# Patient Record
Sex: MALE | Race: WHITE | NOT HISPANIC OR LATINO | URBAN - METROPOLITAN AREA
[De-identification: names, ages, dates, MRNs, and addresses within clinical notes are randomized per-mention and may not be internally consistent; named-entity substitution may affect disease eponyms.]

---

## 2022-06-25 ENCOUNTER — APPOINTMENT (OUTPATIENT)
Dept: CT IMAGING | Facility: CLINIC | Age: 72
DRG: 062 | End: 2022-06-25
Attending: INTERNAL MEDICINE
Payer: COMMERCIAL

## 2022-06-25 ENCOUNTER — APPOINTMENT (OUTPATIENT)
Dept: SPEECH THERAPY | Facility: CLINIC | Age: 72
DRG: 062 | End: 2022-06-25
Attending: INTERNAL MEDICINE
Payer: COMMERCIAL

## 2022-06-25 ENCOUNTER — HOSPITAL ENCOUNTER (INPATIENT)
Facility: CLINIC | Age: 72
LOS: 1 days | Discharge: HOME | DRG: 062 | End: 2022-06-26
Attending: INTERNAL MEDICINE | Admitting: PSYCHIATRY & NEUROLOGY
Payer: COMMERCIAL

## 2022-06-25 DIAGNOSIS — I63.9 ACUTE CVA (CEREBROVASCULAR ACCIDENT) (H): ICD-10-CM

## 2022-06-25 DIAGNOSIS — Z20.822 LAB TEST NEGATIVE FOR COVID-19 VIRUS: ICD-10-CM

## 2022-06-25 LAB
ANION GAP SERPL CALCULATED.3IONS-SCNC: 11 MMOL/L (ref 7–15)
APTT PPP: 30 SECONDS (ref 22–38)
ATRIAL RATE - MUSE: 45 BPM
BASOPHILS # BLD AUTO: 0 10E3/UL (ref 0–0.2)
BASOPHILS NFR BLD AUTO: 0 %
BUN SERPL-MCNC: 25.5 MG/DL (ref 8–23)
CALCIUM SERPL-MCNC: 9.5 MG/DL (ref 8.8–10.2)
CHLORIDE SERPL-SCNC: 108 MMOL/L (ref 98–107)
CHOLEST SERPL-MCNC: 206 MG/DL
CREAT BLD-MCNC: 1 MG/DL (ref 0.7–1.3)
CREAT SERPL-MCNC: 0.99 MG/DL (ref 0.67–1.17)
DEPRECATED HCO3 PLAS-SCNC: 23 MMOL/L (ref 22–29)
DIASTOLIC BLOOD PRESSURE - MUSE: NORMAL MMHG
EOSINOPHIL # BLD AUTO: 0.1 10E3/UL (ref 0–0.7)
EOSINOPHIL NFR BLD AUTO: 3 %
ERYTHROCYTE [DISTWIDTH] IN BLOOD BY AUTOMATED COUNT: 13.2 % (ref 10–15)
GFR SERPL CREATININE-BSD FRML MDRD: 81 ML/MIN/1.73M2
GFR SERPL CREATININE-BSD FRML MDRD: >60 ML/MIN/1.73M2
GLUCOSE BLDC GLUCOMTR-MCNC: 108 MG/DL (ref 70–99)
GLUCOSE BLDC GLUCOMTR-MCNC: 96 MG/DL (ref 70–99)
GLUCOSE SERPL-MCNC: 107 MG/DL (ref 70–99)
HBA1C MFR BLD: 6.3 %
HCT VFR BLD AUTO: 45 % (ref 40–53)
HDLC SERPL-MCNC: 43 MG/DL
HGB BLD-MCNC: 14.8 G/DL (ref 13.3–17.7)
HOLD SPECIMEN: NORMAL
IMM GRANULOCYTES # BLD: 0 10E3/UL
IMM GRANULOCYTES NFR BLD: 0 %
INR PPP: 0.94 (ref 0.85–1.15)
INTERPRETATION ECG - MUSE: NORMAL
LDLC SERPL CALC-MCNC: 136 MG/DL
LYMPHOCYTES # BLD AUTO: 1.2 10E3/UL (ref 0.8–5.3)
LYMPHOCYTES NFR BLD AUTO: 26 %
MCH RBC QN AUTO: 28.7 PG (ref 26.5–33)
MCHC RBC AUTO-ENTMCNC: 32.9 G/DL (ref 31.5–36.5)
MCV RBC AUTO: 87 FL (ref 78–100)
MONOCYTES # BLD AUTO: 0.4 10E3/UL (ref 0–1.3)
MONOCYTES NFR BLD AUTO: 10 %
NEUTROPHILS # BLD AUTO: 2.7 10E3/UL (ref 1.6–8.3)
NEUTROPHILS NFR BLD AUTO: 61 %
NONHDLC SERPL-MCNC: 163 MG/DL
NRBC # BLD AUTO: 0 10E3/UL
NRBC BLD AUTO-RTO: 0 /100
P AXIS - MUSE: 55 DEGREES
PLATELET # BLD AUTO: 193 10E3/UL (ref 150–450)
POTASSIUM SERPL-SCNC: 4.1 MMOL/L (ref 3.4–5.3)
PR INTERVAL - MUSE: 186 MS
QRS DURATION - MUSE: 108 MS
QT - MUSE: 472 MS
QTC - MUSE: 408 MS
R AXIS - MUSE: 71 DEGREES
RADIOLOGIST FLAGS: ABNORMAL
RBC # BLD AUTO: 5.16 10E6/UL (ref 4.4–5.9)
SARS-COV-2 RNA RESP QL NAA+PROBE: NEGATIVE
SODIUM SERPL-SCNC: 142 MMOL/L (ref 136–145)
SYSTOLIC BLOOD PRESSURE - MUSE: NORMAL MMHG
T AXIS - MUSE: 58 DEGREES
TRIGL SERPL-MCNC: 136 MG/DL
TROPONIN T SERPL HS-MCNC: 14 NG/L
TROPONIN T SERPL HS-MCNC: 14 NG/L
TROPONIN T SERPL HS-MCNC: 16 NG/L
TROPONIN T SERPL HS-MCNC: 16 NG/L
VENTRICULAR RATE- MUSE: 45 BPM
WBC # BLD AUTO: 4.4 10E3/UL (ref 4–11)

## 2022-06-25 PROCEDURE — 250N000013 HC RX MED GY IP 250 OP 250 PS 637: Performed by: STUDENT IN AN ORGANIZED HEALTH CARE EDUCATION/TRAINING PROGRAM

## 2022-06-25 PROCEDURE — 93005 ELECTROCARDIOGRAM TRACING: CPT | Performed by: INTERNAL MEDICINE

## 2022-06-25 PROCEDURE — 99291 CRITICAL CARE FIRST HOUR: CPT | Mod: 25 | Performed by: INTERNAL MEDICINE

## 2022-06-25 PROCEDURE — 85025 COMPLETE CBC W/AUTO DIFF WBC: CPT | Performed by: INTERNAL MEDICINE

## 2022-06-25 PROCEDURE — 36415 COLL VENOUS BLD VENIPUNCTURE: CPT | Performed by: INTERNAL MEDICINE

## 2022-06-25 PROCEDURE — 92610 EVALUATE SWALLOWING FUNCTION: CPT | Mod: GN

## 2022-06-25 PROCEDURE — 999N000176 HC STATISTIC STROKE CODE W/O ACCESS

## 2022-06-25 PROCEDURE — 99291 CRITICAL CARE FIRST HOUR: CPT | Performed by: PSYCHIATRY & NEUROLOGY

## 2022-06-25 PROCEDURE — 85730 THROMBOPLASTIN TIME PARTIAL: CPT | Performed by: INTERNAL MEDICINE

## 2022-06-25 PROCEDURE — 85610 PROTHROMBIN TIME: CPT | Performed by: INTERNAL MEDICINE

## 2022-06-25 PROCEDURE — 36415 COLL VENOUS BLD VENIPUNCTURE: CPT | Performed by: NURSE PRACTITIONER

## 2022-06-25 PROCEDURE — 80061 LIPID PANEL: CPT | Performed by: STUDENT IN AN ORGANIZED HEALTH CARE EDUCATION/TRAINING PROGRAM

## 2022-06-25 PROCEDURE — 99285 EMERGENCY DEPT VISIT HI MDM: CPT | Mod: 25 | Performed by: INTERNAL MEDICINE

## 2022-06-25 PROCEDURE — 92526 ORAL FUNCTION THERAPY: CPT | Mod: GN

## 2022-06-25 PROCEDURE — 83036 HEMOGLOBIN GLYCOSYLATED A1C: CPT | Performed by: STUDENT IN AN ORGANIZED HEALTH CARE EDUCATION/TRAINING PROGRAM

## 2022-06-25 PROCEDURE — 3E03317 INTRODUCTION OF OTHER THROMBOLYTIC INTO PERIPHERAL VEIN, PERCUTANEOUS APPROACH: ICD-10-PCS | Performed by: INTERNAL MEDICINE

## 2022-06-25 PROCEDURE — 70496 CT ANGIOGRAPHY HEAD: CPT | Mod: 26 | Performed by: RADIOLOGY

## 2022-06-25 PROCEDURE — 82565 ASSAY OF CREATININE: CPT

## 2022-06-25 PROCEDURE — U0005 INFEC AGEN DETEC AMPLI PROBE: HCPCS | Performed by: INTERNAL MEDICINE

## 2022-06-25 PROCEDURE — 250N000011 HC RX IP 250 OP 636: Performed by: PSYCHIATRY & NEUROLOGY

## 2022-06-25 PROCEDURE — 70496 CT ANGIOGRAPHY HEAD: CPT

## 2022-06-25 PROCEDURE — 250N000011 HC RX IP 250 OP 636: Performed by: INTERNAL MEDICINE

## 2022-06-25 PROCEDURE — 93010 ELECTROCARDIOGRAM REPORT: CPT | Performed by: INTERNAL MEDICINE

## 2022-06-25 PROCEDURE — 200N000002 HC R&B ICU UMMC

## 2022-06-25 PROCEDURE — 70450 CT HEAD/BRAIN W/O DYE: CPT

## 2022-06-25 PROCEDURE — 250N000009 HC RX 250: Performed by: INTERNAL MEDICINE

## 2022-06-25 PROCEDURE — 84484 ASSAY OF TROPONIN QUANT: CPT | Performed by: NURSE PRACTITIONER

## 2022-06-25 PROCEDURE — 82310 ASSAY OF CALCIUM: CPT | Performed by: INTERNAL MEDICINE

## 2022-06-25 PROCEDURE — 70498 CT ANGIOGRAPHY NECK: CPT

## 2022-06-25 PROCEDURE — 70498 CT ANGIOGRAPHY NECK: CPT | Mod: 26 | Performed by: RADIOLOGY

## 2022-06-25 PROCEDURE — 70450 CT HEAD/BRAIN W/O DYE: CPT | Mod: 76

## 2022-06-25 PROCEDURE — C9803 HOPD COVID-19 SPEC COLLECT: HCPCS | Performed by: INTERNAL MEDICINE

## 2022-06-25 PROCEDURE — 84484 ASSAY OF TROPONIN QUANT: CPT | Performed by: INTERNAL MEDICINE

## 2022-06-25 RX ORDER — SIMVASTATIN 20 MG
20 TABLET ORAL AT BEDTIME
Status: ON HOLD | COMMUNITY
End: 2022-06-26

## 2022-06-25 RX ORDER — HYDRALAZINE HYDROCHLORIDE 20 MG/ML
10 INJECTION INTRAMUSCULAR; INTRAVENOUS EVERY 10 MIN PRN
Status: DISCONTINUED | OUTPATIENT
Start: 2022-06-25 | End: 2022-06-26 | Stop reason: HOSPADM

## 2022-06-25 RX ORDER — SODIUM CHLORIDE 9 MG/ML
INJECTION, SOLUTION INTRAVENOUS CONTINUOUS PRN
Status: DISCONTINUED | OUTPATIENT
Start: 2022-06-25 | End: 2022-06-25

## 2022-06-25 RX ORDER — FINASTERIDE 5 MG/1
5 TABLET, FILM COATED ORAL DAILY
COMMUNITY

## 2022-06-25 RX ORDER — FINASTERIDE 5 MG/1
5 TABLET, FILM COATED ORAL DAILY
Status: DISCONTINUED | OUTPATIENT
Start: 2022-06-25 | End: 2022-06-26 | Stop reason: HOSPADM

## 2022-06-25 RX ORDER — HYDRALAZINE HYDROCHLORIDE 20 MG/ML
10-20 INJECTION INTRAMUSCULAR; INTRAVENOUS EVERY 30 MIN PRN
Status: DISCONTINUED | OUTPATIENT
Start: 2022-06-25 | End: 2022-06-25

## 2022-06-25 RX ORDER — LABETALOL HYDROCHLORIDE 5 MG/ML
10 INJECTION, SOLUTION INTRAVENOUS EVERY 10 MIN PRN
Status: DISCONTINUED | OUTPATIENT
Start: 2022-06-25 | End: 2022-06-26 | Stop reason: HOSPADM

## 2022-06-25 RX ORDER — TAMSULOSIN HYDROCHLORIDE 0.4 MG/1
0.4 CAPSULE ORAL 2 TIMES DAILY
Status: DISCONTINUED | OUTPATIENT
Start: 2022-06-25 | End: 2022-06-26 | Stop reason: HOSPADM

## 2022-06-25 RX ORDER — SIMVASTATIN 40 MG
40 TABLET ORAL AT BEDTIME
Status: ON HOLD | COMMUNITY
End: 2022-06-25

## 2022-06-25 RX ORDER — TAMSULOSIN HYDROCHLORIDE 0.4 MG/1
0.4 CAPSULE ORAL DAILY
Status: ON HOLD | COMMUNITY
End: 2022-06-25

## 2022-06-25 RX ORDER — LATANOPROST 50 UG/ML
1 SOLUTION/ DROPS OPHTHALMIC DAILY
COMMUNITY

## 2022-06-25 RX ORDER — ATORVASTATIN CALCIUM 40 MG/1
40 TABLET, FILM COATED ORAL EVERY EVENING
Status: DISCONTINUED | OUTPATIENT
Start: 2022-06-25 | End: 2022-06-26 | Stop reason: HOSPADM

## 2022-06-25 RX ORDER — LIDOCAINE 40 MG/G
CREAM TOPICAL
Status: DISCONTINUED | OUTPATIENT
Start: 2022-06-25 | End: 2022-06-26 | Stop reason: HOSPADM

## 2022-06-25 RX ORDER — FINASTERIDE 5 MG/1
5 TABLET, FILM COATED ORAL DAILY
Status: ON HOLD | COMMUNITY
End: 2022-06-25

## 2022-06-25 RX ORDER — LATANOPROST 50 UG/ML
1 SOLUTION/ DROPS OPHTHALMIC DAILY
Status: DISCONTINUED | OUTPATIENT
Start: 2022-06-25 | End: 2022-06-26 | Stop reason: HOSPADM

## 2022-06-25 RX ORDER — IOPAMIDOL 755 MG/ML
75 INJECTION, SOLUTION INTRAVASCULAR ONCE
Status: COMPLETED | OUTPATIENT
Start: 2022-06-25 | End: 2022-06-25

## 2022-06-25 RX ORDER — LABETALOL HYDROCHLORIDE 5 MG/ML
10-20 INJECTION, SOLUTION INTRAVENOUS EVERY 10 MIN PRN
Status: DISCONTINUED | OUTPATIENT
Start: 2022-06-25 | End: 2022-06-25

## 2022-06-25 RX ORDER — ACETAMINOPHEN 325 MG/1
650 TABLET ORAL EVERY 4 HOURS PRN
Status: DISCONTINUED | OUTPATIENT
Start: 2022-06-25 | End: 2022-06-26 | Stop reason: HOSPADM

## 2022-06-25 RX ORDER — TAMSULOSIN HYDROCHLORIDE 0.4 MG/1
0.4 CAPSULE ORAL 2 TIMES DAILY
COMMUNITY

## 2022-06-25 RX ADMIN — ATORVASTATIN CALCIUM 40 MG: 40 TABLET, FILM COATED ORAL at 19:55

## 2022-06-25 RX ADMIN — FINASTERIDE 5 MG: 5 TABLET, FILM COATED ORAL at 15:39

## 2022-06-25 RX ADMIN — TAMSULOSIN HYDROCHLORIDE 0.4 MG: 0.4 CAPSULE ORAL at 19:55

## 2022-06-25 RX ADMIN — LATANOPROST 1 DROP: 50 SOLUTION OPHTHALMIC at 15:39

## 2022-06-25 RX ADMIN — IOPAMIDOL 75 ML: 755 INJECTION, SOLUTION INTRAVENOUS at 08:09

## 2022-06-25 RX ADMIN — ACETAMINOPHEN 650 MG: 325 TABLET, FILM COATED ORAL at 16:39

## 2022-06-25 RX ADMIN — TENECTEPLASE 23.5 MG: KIT at 08:30

## 2022-06-25 RX ADMIN — SODIUM CHLORIDE, PRESERVATIVE FREE 90 ML: 5 INJECTION INTRAVENOUS at 08:09

## 2022-06-25 ASSESSMENT — ENCOUNTER SYMPTOMS
FEVER: 0
FACIAL ASYMMETRY: 1
NECK STIFFNESS: 0
WEAKNESS: 1
CONFUSION: 1
DIFFICULTY URINATING: 0
HEADACHES: 0
SHORTNESS OF BREATH: 0
ARTHRALGIAS: 0
EYE REDNESS: 0
COLOR CHANGE: 0
ABDOMINAL PAIN: 0

## 2022-06-25 ASSESSMENT — ACTIVITIES OF DAILY LIVING (ADL)
CHANGE_IN_FUNCTIONAL_STATUS_SINCE_ONSET_OF_CURRENT_ILLNESS/INJURY: YES
DRESSING/BATHING_DIFFICULTY: NO
TOILETING_ISSUES: NO
ADLS_ACUITY_SCORE: 21
DOING_ERRANDS_INDEPENDENTLY_DIFFICULTY: NO
ADLS_ACUITY_SCORE: 21
VISION_MANAGEMENT: GLASSES
CONCENTRATING,_REMEMBERING_OR_MAKING_DECISIONS_DIFFICULTY: NO
ADLS_ACUITY_SCORE: 21
FALL_HISTORY_WITHIN_LAST_SIX_MONTHS: NO
ADLS_ACUITY_SCORE: 21
WALKING_OR_CLIMBING_STAIRS_DIFFICULTY: NO
ADLS_ACUITY_SCORE: 21
ADLS_ACUITY_SCORE: 21
ADLS_ACUITY_SCORE: 35
WEAR_GLASSES_OR_BLIND: YES
DIFFICULTY_EATING/SWALLOWING: NO
ADLS_ACUITY_SCORE: 35

## 2022-06-25 ASSESSMENT — VISUAL ACUITY
OU: GLASSES;BASELINE

## 2022-06-25 NOTE — PHARMACY-ADMISSION MEDICATION HISTORY
Admission Medication History Completed by Pharmacy    See [facility] Admission Navigator for allergy information, preferred outpatient pharmacy, prior to admission medications and immunization status.     Medication History Sources:     Patient    [facility]    Changes made to PTA medication list (reason):    Added: All     Deleted: None    Changed: None    Additional Information:    Patient new indications for which he was on medications, but couldn't recall his medications. All medications per [facility] fill history.     Prior to Admission medications    Medication Sig Last Dose Taking? Auth Provider Long Term End Date   finasteride (PROSCAR) 5 MG tablet Take 5 mg by mouth daily  Yes Unknown, Entered By History     latanoprost (XALATAN) 0.005 % ophthalmic solution Place 1 drop into both eyes daily  Yes Unknown, Entered By History Yes    simvastatin (ZOCOR) 20 MG tablet Take 20 mg by mouth At Bedtime  Yes Unknown, Entered By History No    tamsulosin (FLOMAX) 0.4 MG capsule Take 0.4 mg by mouth 2 times daily  Yes Unknown, Entered By History         Date completed: 06/25/22    Medication history completed by: Christofer Rausch RP

## 2022-06-25 NOTE — ED TRIAGE NOTES
"Triage Assessment & Note:    BP (!) 175/52   Pulse (!) 45   Temp 97.3  F (36.3  C) (Oral)   Resp 16   Ht 1.854 m (6' 1\")   Wt 93 kg (205 lb)   SpO2 98%   BMI 27.05 kg/m      Patient presents with: BIBA SPF for new onset left side weakness left facial droop and increased confusion with on set around 0700 today.     Home Treatments/Remedies: None    Febrile / Afebrile? Afebrile     Duration of C/o: 1 hr     Alcon Junior RN  June 25, 2022         Triage Assessment     Row Name 06/25/22 0806       Triage Assessment (Adult)    Airway WDL WDL       Respiratory WDL    Respiratory WDL WDL       Cognitive/Neuro/Behavioral WDL    Cognitive/Neuro/Behavioral WDL WDL              "

## 2022-06-25 NOTE — PROGRESS NOTES
Admitted/transferred from: ED   Reason for admission/transfer: [place] with TNK  2 RN skin assessment: completed by: Tarsha KRUGER  Result of skin assessment and interventions/actions: None taken  Height, weight, drug calc weight: Done  Patient belongings (see Flowsheet)  MDRO education added to care plan: Yes

## 2022-06-25 NOTE — PROGRESS NOTES
Stroke Code Nurse-Responder Note    Arrival Time to Stroke Code:0800    Stroke Code Team interventions: Tenecteplase given by Alcon RANGEL ED RN double checked by Maureen Berumen RN at 0830      ED/Bedside Nurse providing handoff: Alcon RANGEL    Time left for CT: in CT at 0809    Time arrived to next location (ED/Unit/IR): Back in ED at 0815    ED/Bedside Nurse given handoff (name/time): ALIYAH David RN at 1100    Luisa Bertrand, RN

## 2022-06-25 NOTE — PHARMACY
Pharmacy Stroke Code Response    Pharmacist responded as part of the Stroke Code Team activation to patient care area ED.      Patient weight (in kg): 93 kg.  Weight was obtained from RX STROKE WEIGHT: ED gurney weight.    The Stroke Team determined that the patient was a candidate for IV tenecteplase therapy and requested that tenecteplase be made at 0825.    Dose of tenecteplase: A total dose of 23.25 mg was calculated (0.25 mg/kg). This is to be given as a bolus over 5 seconds. Calculation double check performed by: DMITRIY Rondon.    The tenecteplase dose was handed off to nursing at 0830.    The tenecteplase dose was administered at 0830.  These were administered in ED.    The neurology team entered the tenecteplase orders into [facility].      Christofer Rausch RPH

## 2022-06-25 NOTE — H&P
Neuroscience Intensive Care History & Physical    Reason for critical care admission: post tNK  Admitting Team: MORGAN  Date of Service:  06/25/2022  Date of Admission:  6/25/2022  Hospital Day: 1    Assessment/Plan  Trev Bergeron is a 72 year old male with a past medical history of HTN and BPH admitted on 6/25/2022 for left hemiparesis and left sensory loss in the setting of R M2 stenosis vs occlusion, now s/p tNK.    Neuro  #R M2 occlusion:  -Neurochecks every 1 hrs  -MRI Brain without IV contrast pending  -TTE, EKG, troponin  -LDL, HgbA1c pending  -Cardiac monitoring  -Antiplatelet and anticoagulation held until outside 24 hour window  -HOB > 30   -SBP goal < 180 mmHg  -PT/OT/SLP    #Analgesics & sedation  -Tylenol prn     CV  #Hypertension:  -Cardiac monitoring  -SBP goal < 180 mmHg  -PRN labetalol and hydralazine    Resp  Oxygen/vent: none on room air  -Continuous pulse ox  -Maintain O2 saturations greater than 92%    GI  Diet: cleared for regular diet  Last BM: PTA  GI prophylaxis: not indicated  -Bowel regimen: scheduled senna-docusate and Miralax    Renal/  -Daily BMP  -IV fluids: none  -Electrolyte replacement protocol    Endo  -Hgb A1c pending  -Monitor glucose levels    Heme  -Daily CBC  -Hgb goal >7, plt goal >50k  -Transfuse to meet Hgb and plt goals    ID  -Afebrile  -Daily CBC  -Follow temperature curve    ICU Check List  Lines/tubes/drains:  FEN: regular diet  PPX: DVT - SCDs, no chemoppx due to tNK; GI - not indicated.  Code: Full Code  Dispo: ICU - NCC    Clinically Significant Risk Factors Present on Admission                         TIME SPENT ON THIS ENCOUNTER   I spent 35 minutes of critical care time on the unit/floor managing the care of Trev Bergeron . Upon evaluation, this patient had a high probability of imminent or life-threatening deterioration due to stroke s/p thrombolytic, which required my direct attention, intervention, and personal management. Greater than 50% of my time was  "spent at the bedside counseling the patient and/or coordinating care regarding services listed in this note.    Liane Jo MD    History of Present Illness:  Trev Bergeron is a 72 year old male with a past medical history of hypertension and BPH admitted on 6/25/2022 for left hemiparesis s/p tNK.      Patient reportedly woke up around 6 am and then around 7 am he noted onset of left sided weakness, left sided numbness, and left facial droop.  A stroke code was called upon arrival to the ED and initial NIHSS 4 (points for sensory deficits, left facial, and left homonymous hemianopsia).  CTA showed a right M2 occlusion vs high grade stenosis.  Patient received tNK at 0830.  NIHSS improved to a 0-1 with only subtle left hand weakness and subtle left facial asymmetry (patient has this at baseline).  He was admitted to the ICU post thrombolytic and denied any complaints.    Allergies   Allergen Reactions     Penicillins        PAST MEDICAL HISTORY: No past medical history on file.    PAST SURGICAL HISTORY: No past surgical history on file.    FAMILY HISTORY:   No significant family history, including no history of      SOCIAL HISTORY:   Social History     Tobacco Use     Smoking status: Not on file     Smokeless tobacco: Not on file   Substance Use Topics     Alcohol use: Not on file       ROS: The 10 point Review of Systems is negative other than noted in the HPI or here.    Current Medications:    sodium chloride (PF)  3 mL Intracatheter Q8H       PRN Medications:  labetalol **OR** hydrALAZINE, lidocaine 4%, lidocaine (buffered or not buffered), - MEDICATION INSTRUCTIONS -, niCARdipine, sodium chloride (PF)    Infusions:    - MEDICATION INSTRUCTIONS -       niCARdipine         Physical Examination:  Vitals: BP (!) 153/88   Pulse (!) 47   Temp 97.6  F (36.4  C) (Oral)   Resp 16   Ht 1.905 m (6' 3\")   Wt 88.7 kg (195 lb 8.8 oz)   SpO2 98%   BMI 24.44 kg/m    General: Adult male patient, lying in bed, " NAD  HEENT: Normocephalic, atraumatic, no icterus, oral cavity/oropharynx pink and moist  Cardiac: RRR, s1/s2 auscultated without murmur, S3/S4  Chest: CTAB, unlabored, expansion symmetric, no retractions or use of accessory muscles  Abdomen: Soft, non-distended, bowel sounds present  Extremities: Warm, no edema, distal pulses +2, well perfused  Skin: No rash or lesion  Psych: Calm and cooperative  Neuro:  Mental status: Awake, alert, attentive, oriented to self, time, place, and circumstance. Language is fluent and coherent with intact comprehension of complex commands, naming and repetition.  Cranial nerves: VFF, PERRL, conjugate gaze, EOMI, pupils +* round and reactive, facial sensation intact, mild left facial asymmetry, shoulder shrug strong, tongue midline, no dysarthria.   Motor: Normal bulk and tone. No abnormal movements. 5/5 strength in 4/4 extremities.   Sensory: Intact to light touch x 4 extremities  Coordination: FNF and HS without ataxia or dysmetria. Rapid alternating movements intact.   Gait: BIPIN, deferred.    NIHSS  Interval baseline (06/25/22 0820)   1a. Level of Consciousness 0-->Alert, keenly responsive   1b. LOC Questions 0-->Answers both questions correctly   1c. LOC Commands 0-->Performs both tasks correctly   2.   Best Gaze 0-->Normal   3.   Visual 0-->No visual loss   4.   Facial Palsy 1-->Minor paralysis (flattened nasolabial fold, asymmetry on smiling)   5a. Motor Arm, Left 0-->No drift, limb holds 90 (or 45) degrees for full 10 secs   5b. Motor Arm, Right 0-->No drift, limb holds 90 (or 45) degrees for full 10 secs   6a. Motor Leg, Left 0-->No drift, leg holds 30 degree position for full 5 secs   6b. Motor Leg, right 0-->No drift, leg holds 30 degree position for full 5 secs   7.   Limb Ataxia 0-->Absent   8.   Sensory 0-->Normal, no sensory loss   9.   Best Language 0-->No aphasia, normal   10. Dysarthria 0-->Normal   11. Extinction and Inattention  0-->No abnormality   Total 1 (06/25/22  1230)     ICH Score (at arrival)  Scoring Tool Score   Age ? 80 years 1 point     GCS score  3-4   5-12   13-15   2 point  1 point  0 point     Hematoma volume, cm 3 ? 30 1 point     Intraventricular extension 1 point     Infratentorial location 1 point     Total       Hunt and Ty Scale (at arrival)  Grade           Labs:  Recent Labs   Lab 22  0806 22  0800   NA  --  142   POTASSIUM  --  4.1   CHLORIDE  --  108*   CO2  --  23   BUN  --  25.5*   CR 1.0 0.99   BUTCH  --  9.5       Recent Labs   Lab 22  0800   WBC 4.4   HGB 14.8          No results for input(s): PH, PCO2, PO2, HCO3 in the last 168 hours.    All cultures:  No results for input(s): CULTURE in the last 168 hours.    Imagin22 CTA Head and Neck:  Impression:    1. Focal occlusion versus high-grade narrowing of an M2 branch of the  right MCA.  2. 50% stenosis of the proximal right ICA.  3. 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    22 CT Head without IV contrast:  Impression:  No acute intracranial pathology.     All relevant imaging and laboratory values personally reviewed.

## 2022-06-25 NOTE — ED NOTES
Bed: ED03  Expected date: 6/25/22  Expected time: 7:57 AM  Means of arrival: Ambulance  Comments:  [address]    73 y/o Male    Weakness  Confusion    Last known well 0650    Glucose 101  /85

## 2022-06-25 NOTE — PROGRESS NOTES
06/25/22 1254   General Information   Onset of Illness/Injury or Date of Surgery 06/25/22   Referring Physician Dane Alexandre MD   Patient/Family Therapy Goal Statement (SLP) None stated   Pertinent History of Current Problem 72M w/ pmhx HLD on simvastatin, BPH  presented with left sided weakness (mild) and left facial droop, stroke code was called LKN was 7 am (woke up at 6 am).  NIHSS was 4 in the ED for sensory deficits and left facial, and left homonymous hemiansopsia.  CT and CTA showed R M2 distal occlusion vs stenosis, tNK was given.  NIHSS improved to a 0, subtle hand weakness may still be present and question of left subtle facial droop. STAT swallow eval ordered per NCC. Clinical swallow eval completed at 1130 per MD orders.   Past History of Dysphagia No hx of dysphagia per chart review or pt report   Type of Evaluation   Type of Evaluation Swallow Evaluation   Oral Motor   Oral Musculature anomalies present   Structural Abnormalities none present   Mucosal Quality adequate   Dentition (Oral Motor)   Dentition (Oral Motor) adequate dentition   Facial Symmetry (Oral Motor)   Facial Symmetry (Oral Motor) left side impairment   Left Side Facial Asymmetry minimal impairment  (baseline per wife and pt report)   Lip Function (Oral Motor)   Lip Range of Motion (Oral Motor) WNL   Tongue Function (Oral Motor)   Tongue ROM (Oral Motor) WNL   Jaw Function (Oral Motor)   Jaw Function (Oral Motor) WNL   Cough/Swallow/Gag Reflex (Oral Motor)   Soft Palate/Velum (Oral Motor) WNL   Volitional Throat Clear/Cough (Oral Motor) WNL   Vocal Quality/Secretion Management (Oral Motor)   Vocal Quality (Oral Motor) WNL   General Swallowing Observations   Respiratory Support (General Swallowing Observations) none   Current Diet/Method of Nutritional Intake (General Swallowing Observations, NIS) NPO   Swallowing Evaluation Clinical swallow evaluation   Clinical Swallow Evaluation   Feeding Assistance no assistance needed    Clinical Swallow Evaluation Textures Trialed thin liquids;pureed;solid foods   Clinical Swallow Eval: Thin Liquid Texture Trial   Mode of Presentation, Thin Liquids cup;self-fed   Volume of Liquid or Food Presented 5 oz   Oral Phase of Swallow WFL   Pharyngeal Phase of Swallow throat clearing   Diagnostic Statement Thin liquids via cup resulted in minimal throat clearing, however this was unchanged from baseline throat clearing. No additional overt s/sx of aspiration.   Clinical Swallow Evaluation: Puree Solid Texture Trial   Mode of Presentation, Puree spoon;self-fed   Volume of Puree Presented 4 tbsp   Oral Phase, Puree WFL   Pharyngeal Phase, Puree throat clearing   Diagnostic Statement Pureed textures resulted in minimal throat clearing, however this was unchanged from baseline throat clearing. No additional overt s/sx of aspiration.   Clinical Swallow Evaluation: Solid Food Texture Trial   Mode of Presentation self-fed   Volume Presented 4 tbsp   Oral Phase WFL   Pharyngeal Phase throat clearing   Diagnostic Statement Regular solid textures resulted in minimal throat clearing, however this was unchanged from baseline throat clearing. No additional overt s/sx of aspiration.   Swallowing Recommendations   Diet Consistency Recommendations thin liquids (level 0);regular diet   Supervision Level for Intake patient independent   Mode of Delivery Recommendations bolus size, small;food moistened;slow rate of intake   Swallowing Maneuver Recommendations alternate food and liquid intake   Monitoring/Assistance Required (Eating/Swallowing) monitor for cough or change in vocal quality with intake;stop eating activities when fatigue is present;cue for finger/lingual sweep if oral pocketing present   Recommended Feeding/Eating Techniques (Swallow Eval) maintain upright sitting position for eating;maintain upright posture during/after eating for 30 minutes;set-up and prepare tray   Medication Administration Recommendations,  Swallowing (SLP) as tolerated   Instrumental Assessment Recommendations instrumental evaluation not recommended at this time   General Therapy Interventions   Planned Therapy Interventions Dysphagia Treatment   Dysphagia treatment Instruction of safe swallow strategies;Compensatory strategies for swallowing   Clinical Impression   Criteria for Skilled Therapeutic Interventions Met (SLP Eval) Current level of function same as previous level of function   SLP Diagnosis Oropharyngeal swallow WFL   Risks & Benefits of therapy have been explained evaluation/treatment results reviewed;care plan/treatment goals reviewed;risks/benefits reviewed;current/potential barriers reviewed;participants voiced agreement with care plan;participants included;patient;spouse/significant other   Clinical Impression Comments Clinical swallow eval completed per MD orders. Pt presents with functional oropharyngeal swallowing skills. Pt with very minimal L sided facial droop, which is baseline per pt report. Oral mech exam otherwise unremarkable. Thin liquids via cup, pureed, and regular solid textures resulted in minimal throat clearing, however this was unchanged from pt's baseline. No additional overt s/sx of aspiration across PO trials. Pt with functional time for mastication. Recommend regular textures and thin liquids. Pt should be fully upright and alert for all PO, take small sips/bites, slow pacing, and alternate between consistencies. Pt and wife verbalized good understanding. Suspect pt is at baseline swallow function. No additional ST needs indicated.   SLP Discharge Planning   SLP Discharge Recommendation home with assist   SLP Rationale for DC Rec suspect pt is at baseline swallow function   SLP Brief overview of current status  Recommend regular textures and thin liquids. Pt should be fully upright and alert for all PO, take small sips/bites, slow pacing, and alternate between consistencies.    Total Evaluation Time   Total  Evaluation Time (Minutes) 9   SLP Goals   Therapy Frequency (SLP Eval) one time eval and treatment only

## 2022-06-25 NOTE — ED PROVIDER NOTES
"ED Provider Note  [facility]      History     Chief Complaint   Patient presents with     Stroke Symptoms     The history is provided by medical records, a relative and the patient. The history is limited by the condition of the patient.     Trev Bergeron is a 72 year old male with a PMH of high cholesterol and BPH presents to the Emergency Department with stroke symptoms. Patient's family reports him experiencing some left sided body weakness and left sided facial drooping. Symptoms began around 6:50 am this morning. EMS reports patient having a glucose level of 101.    Past Medical History  No past medical history on file.  No past surgical history on file.  No current outpatient medications on file.    Allergies   Allergen Reactions     Penicillins      Family History  No family history on file.  Social History       Past medical history, past surgical history, medications, allergies, family history, and social history were reviewed with the patient. No additional pertinent items.       Review of Systems   Constitutional: Negative for fever.   HENT: Negative for congestion.    Eyes: Negative for redness.   Respiratory: Negative for shortness of breath.    Cardiovascular: Negative for chest pain.   Gastrointestinal: Negative for abdominal pain.   Genitourinary: Negative for difficulty urinating.   Musculoskeletal: Negative for arthralgias and neck stiffness.   Skin: Negative for color change.   Neurological: Positive for facial asymmetry (left sided drooping) and weakness (left side of body). Negative for headaches.   Psychiatric/Behavioral: Positive for confusion.     A complete review of systems was performed with pertinent positives and negatives noted in the HPI, and all other systems negative.    Physical Exam   BP: (!) 175/52  Pulse: (!) 45  Temp: 97.3  F (36.3  C)  Resp: 16  Height: 185.4 cm (6' 1\")  Weight: 93 kg (205 lb)  SpO2: 98 %  Physical Exam  Constitutional:       " General: He is not in acute distress.     Appearance: He is not diaphoretic.   HENT:      Head: Atraumatic.   Eyes:      General: No scleral icterus.     Pupils: Pupils are equal, round, and reactive to light.   Cardiovascular:      Rate and Rhythm: Regular rhythm. Bradycardia present.      Heart sounds: Normal heart sounds. No murmur heard.    No friction rub. No gallop.   Pulmonary:      Effort: Pulmonary effort is normal. No respiratory distress.      Breath sounds: Normal breath sounds. No stridor. No wheezing, rhonchi or rales.   Chest:      Chest wall: No tenderness.   Abdominal:      General: Abdomen is flat. Bowel sounds are normal. There is no distension.      Palpations: Abdomen is soft. There is no mass.      Tenderness: There is no abdominal tenderness. There is no right CVA tenderness, left CVA tenderness, guarding or rebound.      Hernia: No hernia is present.   Musculoskeletal:         General: No tenderness.      Cervical back: Neck supple.   Skin:     General: Skin is warm.      Findings: No rash.   Neurological:      Cranial Nerves: Cranial nerve deficit (left facial droop) present.      Motor: Weakness (left arm) present.      Comments: Left visual neglect         ED Course     8:03 AM  The patient was seen and examined by Dr. Roberto Billy MD in Room ED03.     Procedures            EKG Interpretation:      Interpreted by Roberto Billy MD, MD  Time reviewed: on arrival  Symptoms at time of EKG: left side weakness   Rhythm: sinus bradycardia  Rate: Bradycardia  Axis: Normal  Ectopy: none  Conduction: normal  ST Segments/ T Waves: No ST-T wave changes  Q Waves: none  Comparison to prior: No old EKG available    Clinical Impression: sinus bradycardia              Critical Care Addendum    My initial assessment, based on my review of prehospital provider report, review of nursing observations, review of vital signs, focused history, physical exam, review of cardiac rhythm monitor, 12 lead ECG  analysis and discussion with Neuro Stroke, established that Trev Bergeron has altered mental status and focal neurologic abnormalities, which requires immediate intervention, and therefore he is critically ill.     After the initial assessment, the care team initiated multiple lab tests, initiated IV fluid administration and initiated medication therapy with TPA per Neuro Stroke to provide stabilization care. Due to the critical nature of this patient, I reassessed nursing observations, vital signs, physical exam, review of cardiac rhythm monitor, 12 lead ECG analysis, mental status, neurologic status and respiratory status multiple times prior to his disposition.     Time also spent performing documentation, discussion with family to obtain medical information for decision making, reviewing test results, discussion with consultants and coordination of care.     Critical care time (excluding teaching time and procedures): 40 minutes.               Results for orders placed or performed during the hospital encounter of 06/25/22   CT Head w/o Contrast     Status: None    Narrative    CT HEAD W/O CONTRAST 6/25/2022 8:20 AM    History: left side weakness     Comparison: None    Technique: Using multidetector thin collimation helical acquisition  technique, axial, coronal and sagittal CT images from the skull base  to the vertex were obtained without intravenous contrast.   (topogram) image(s) also obtained and reviewed.    Findings: There is no intracranial hemorrhage, mass effect, or midline  shift. Gray/white matter differentiation in both cerebral hemispheres  is preserved. Ventricles are proportionate to the cerebral sulci. The  basal cisterns are clear. Prominent retrocerebellar CSF space,  arachnoid cyst versus magna cisterna magna.    The bony calvaria and the bones of the skull base are normal. Polypoid  mucosal thickening in the maxillary sinuses. Left maxillary molar  periapical abscesses without  involvement of the adjacent soft tissues.      Impression    Impression:  1. No acute intracranial pathology.   2. Left maxillary molar periapical abscesses without involvement of  the adjacent soft tissues.    I have personally reviewed the examination and initial interpretation  and I agree with the findings.    PAKO ARRIETA MD         SYSTEM ID:  [id number]   CTA Head Neck with Contrast     Status: Abnormal   Result Value Ref Range    Radiologist flags Focal M2 right MCA occlusion (Urgent)     Narrative    CTA HEAD NECK W CONTRAST 6/25/2022 8:19 AM    CT angiogram of the neck   CT angiogram of the base of the brain with contrast  Reconstruction on the 3D workstation    Provided History:  left side weakness    Comparison:  None.      Technique:  HEAD and NECK CTA: During rapid bolus intravenous injection of  nonionic contrast material, axial images were obtained using thin  collimation multidetector helical technique from the base of the upper  aortic arch through the [place]igh[place] of [place]. This CT angiogram data was  reconstructed at thin intervals with mild overlap. Images were sent to  the 3D workstation, and 3D reconstructions were obtained. The axial  source images, multiplanar reformations, 3D reconstructions in both  maximum intensity projection display and volume rendered models were  reviewed, with reconstructions performed by the technologist.    Contrast: iopamidol (ISOVUE-370) solution 75 mL     Findings:    Head CTA: Focal occlusion of an M2 branch of the right MCA (series 5,  image 181), with distal reconstitution.    Right posterior communicating arteries patent. Left posterior  communicating artery not seen. Anterior communicating artery is  patent. ACAs are unremarkable.     Neck CTA: Stenosis of the proximal right internal carotid artery  measuring 2 mm in diameter, approximately 50% stenosis  (three-dimensional reconstruction images not yet obtained at the time  of initial interpretation). Left  common carotid and internal carotid  arteries are patent and normal in caliber. Right dominant  vertebrobasilar system, with congenitally diminutive left vertebral  artery terminating in the left PICA. Both Internal carotid arteries  measure 5 mm in diameter.    No mass within the visualized portions of the cervical soft tissues or  lung apices.       Impression    Impression:    1. Focal occlusion versus high-grade narrowing of an M2 branch of the  right MCA.  2. 50% stenosis of the proximal right ICA.  3. 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    [Urgent Result: Focal M2 right MCA occlusion]    Finding was identified on 6/25/2022 8:35 AM.     Dr. Billy was contacted by Dr. Hernandez at 6/25/2022 8:48 AM and  verbalized understanding of the urgent finding.     I have personally reviewed the examination and initial interpretation  and I agree with the findings.    PAKO ARRIETA MD         SYSTEM ID:  [id number]   Head CT w/o contrast     Status: None    Narrative    CT HEAD W/O CONTRAST 6/25/2022 10:53 AM    History: ha after TPA     Comparison: Same day    Technique: Using multidetector thin collimation helical acquisition  technique, axial, coronal and sagittal CT images from the skull base  to the vertex were obtained without intravenous contrast.   (topogram) image(s) also obtained and reviewed.    Findings: There is no intracranial hemorrhage, mass effect, or midline  shift. Gray/white matter differentiation in both cerebral hemispheres  is preserved. Ventricles are proportionate to the cerebral sulci. The  basal cisterns are clear.    The bony calvaria and the bones of the skull base are normal. The  visualized portions of the paranasal sinuses and mastoid air cells are  clear.       Impression    Impression:  No acute intracranial pathology.     I have personally reviewed the examination and initial interpretation  and I agree with  the findings.    PAKO ARRIETA MD         SYSTEM ID:  [id number]   [place] Draw     Status: None    Narrative    The following orders were created for panel order [place] Draw.  Procedure                               Abnormality         Status                     ---------                               -----------         ------                     Extra Blue Top Tube[[id number]]                              Final result               Extra Red Top Tube[[id number]]                               Final result               Extra Green Top (Lithium...[[id number]]                      Final result               Extra Green Top (Lithium...[[id number]]                      Final result               Extra Purple Top Tube[[id number]]                            Final result               Extra Purple Top Tube[[id number]]                            Final result                 Please view results for these tests on the individual orders.   Extra Blue Top Tube     Status: None   Result Value Ref Range    Hold Specimen JIC    Extra Red Top Tube     Status: None   Result Value Ref Range    Hold Specimen JIC    Extra Green Top (Lithium Heparin) Tube     Status: None   Result Value Ref Range    Hold Specimen JIC    Extra Green Top (Lithium Heparin) Tube     Status: None   Result Value Ref Range    Hold Specimen JIC    Extra Purple Top Tube     Status: None   Result Value Ref Range    Hold Specimen JIC    Extra Purple Top Tube     Status: None   Result Value Ref Range    Hold Specimen JIC    Basic metabolic panel     Status: Abnormal   Result Value Ref Range    Creatinine 0.99 0.67 - 1.17 mg/dL    Sodium 142 136 - 145 mmol/L    Potassium 4.1 3.4 - 5.3 mmol/L    Urea Nitrogen 25.5 (H) 8.0 - 23.0 mg/dL    Chloride 108 (H) 98 - 107 mmol/L    Carbon Dioxide (CO2) 23 22 - 29 mmol/L    Anion Gap 11 7 - 15 mmol/L    Glucose 107 (H) 70 - 99 mg/dL    GFR Estimate 81 >60 mL/min/1.73m2    Calcium 9.5 8.8 - 10.2 mg/dL   INR     Status: Normal    Result Value Ref Range    INR 0.94 0.85 - 1.15   Partial thromboplastin time     Status: Normal   Result Value Ref Range    aPTT 30 22 - 38 Seconds   Troponin T, High Sensitivity     Status: Normal   Result Value Ref Range    Troponin T, High Sensitivity 16 <=22 ng/L   CBC with platelets and differential     Status: None   Result Value Ref Range    WBC Count 4.4 4.0 - 11.0 10e3/uL    RBC Count 5.16 4.40 - 5.90 10e6/uL    Hemoglobin 14.8 13.3 - 17.7 g/dL    Hematocrit 45.0 40.0 - 53.0 %    MCV 87 78 - 100 fL    MCH 28.7 26.5 - 33.0 pg    MCHC 32.9 31.5 - 36.5 g/dL    RDW 13.2 10.0 - 15.0 %    Platelet Count 193 150 - 450 10e3/uL    % Neutrophils 61 %    % Lymphocytes 26 %    % Monocytes 10 %    % Eosinophils 3 %    % Basophils 0 %    % Immature Granulocytes 0 %    NRBCs per 100 WBC 0 <1 /100    Absolute Neutrophils 2.7 1.6 - 8.3 10e3/uL    Absolute Lymphocytes 1.2 0.8 - 5.3 10e3/uL    Absolute Monocytes 0.4 0.0 - 1.3 10e3/uL    Absolute Eosinophils 0.1 0.0 - 0.7 10e3/uL    Absolute Basophils 0.0 0.0 - 0.2 10e3/uL    Absolute Immature Granulocytes 0.0 <=0.4 10e3/uL    Absolute NRBCs 0.0 10e3/uL   Creatinine POCT     Status: Normal   Result Value Ref Range    Creatinine POCT 1.0 0.7 - 1.3 mg/dL    GFR, ESTIMATED POCT >60 >60 mL/min/1.73m2   Asymptomatic COVID-19 Virus (Coronavirus) by PCR Nasopharyngeal     Status: Normal    Specimen: Nasopharyngeal; Swab   Result Value Ref Range    SARS CoV2 PCR Negative Negative, Testing sent to reference lab. Results will be returned via unsolicited result    Narrative    Testing was performed using the Xpert Xpress SARS-CoV-2 Assay on the  Cepheid Gene-Xpert Instrument Systems. Additional information about  this Emergency Use Authorization (EUA) assay can be found via the Lab  Guide. This test should be ordered for the detection of SARS-CoV-2 in  individuals who meet SARS-CoV-2 clinical and/or epidemiological  criteria. Test performance is unknown in asymptomatic patients.  This  test is for in vitro diagnostic use under the FDA EUA for  laboratories certified under CLIA to perform high complexity testing.  This test has not been FDA cleared or approved. A negative result  does not rule out the presence of PCR inhibitors in the specimen or  target RNA in concentration below the limit of detection for the  assay. The possibility of a false negative should be considered if  the patient's recent exposure or clinical presentation suggests  COVID-19. This test was validated by the [facility]. This laboratory is certified under  the Clinical Laboratory Improvement Amendments of 1988 (CLIA-88) as  qualified to perform high complexity laboratory testing.     Glucose by meter     Status: Normal   Result Value Ref Range    GLUCOSE BY METER POCT 96 70 - 99 mg/dL   Lipid panel reflex to direct LDL     Status: Abnormal   Result Value Ref Range    Cholesterol 206 (H) <200 mg/dL    Triglycerides 136 <150 mg/dL    Direct Measure HDL 43 >=40 mg/dL    LDL Cholesterol Calculated 136 (H) <=100 mg/dL    Non HDL Cholesterol 163 (H) <130 mg/dL    Narrative    Cholesterol  Desirable:  <200 mg/dL    Triglycerides  Normal:  Less than 150 mg/dL  Borderline High:  150-199 mg/dL  High:  200-499 mg/dL  Very High:  Greater than or equal to 500 mg/dL    Direct Measure HDL  Female:  Greater than or equal to 50 mg/dL   Male:  Greater than or equal to 40 mg/dL    LDL Cholesterol  Desirable:  <100mg/dL  Above Desirable:  100-129 mg/dL   Borderline High:  130-159 mg/dL   High:  160-189 mg/dL   Very High:  >= 190 mg/dL    Non HDL Cholesterol  Desirable:  130 mg/dL  Above Desirable:  130-159 mg/dL  Borderline High:  160-189 mg/dL  High:  190-219 mg/dL  Very High:  Greater than or equal to 220 mg/dL   EKG 12-lead, tracing only     Status: None   Result Value Ref Range    Systolic Blood Pressure  mmHg    Diastolic Blood Pressure  mmHg    Ventricular Rate 45 BPM    Atrial Rate  45 BPM    NV Interval 186 ms    QRS Duration 108 ms     ms    QTc 408 ms    P Axis 55 degrees    R AXIS 71 degrees    T Axis 58 degrees    Interpretation ECG       Sinus bradycardia  Incomplete right bundle branch block  Borderline ECG  Unconfirmed report - interpretation of this ECG is computer generated - see medical record for final interpretation  Confirmed by - EMERGENCY ROOM, PHYSICIAN ([id number]),  LIZ DIAZ ([id number]) on 6/25/2022 10:11:15 AM     CBC with Platelets & Differential     Status: None    Narrative    The following orders were created for panel order CBC with Platelets & Differential.  Procedure                               Abnormality         Status                     ---------                               -----------         ------                     CBC with platelets and d...[[id number]]                      Final result                 Please view results for these tests on the individual orders.     Medications   labetalol (NORMODYNE/TRANDATE) injection 10 mg (has no administration in time range)     Or   hydrALAZINE (APRESOLINE) injection 10 mg (has no administration in time range)   niCARdipine 40 mg in 200 mL NS (CARDENE) infusion (has no administration in time range)   lidocaine 1 % 0.1-1 mL (has no administration in time range)   lidocaine (LMX4) cream (has no administration in time range)   sodium chloride (PF) 0.9% PF flush 3 mL (has no administration in time range)   sodium chloride (PF) 0.9% PF flush 3 mL (has no administration in time range)   Medication Instruction - Avoid dextrose in IV solutions (has no administration in time range)   CT saline (90 mLs Intravenous Given 6/25/22 0809)   iopamidol (ISOVUE-370) solution 75 mL (75 mLs Intravenous Given 6/25/22 0809)   tenecteplase (TNKase) injection 23.5 mg (23.5 mg Intravenous Given 6/25/22 0830)        Assessments & Plan (with Medical Decision Making)  Acute CVA with onset 6:50 am and left side weakness and visual  neglect, Tiear 1 Stroke code called and eventually pt was given TPA, admt to Neuro ICU. Noted new HA after TPA-repeat CT ordered.       I have reviewed the nursing notes. I have reviewed the findings, diagnosis, plan and need for follow up with the patient.    There are no discharge medications for this patient.      Final diagnoses:   Acute CVA (cerebrovascular accident) (H)   IMaria Victoria, am serving as a trained medical scribe to document services personally performed by Roberto Billy MD, based on the provider's statements to me.     IRoberto MD, was physically present and have reviewed and verified the accuracy of this note documented by Maria Victoria Macias.      --  Roberto Billy MD  [facility] EMERGENCY DEPARTMENT  6/25/2022     Roberto Billy MD  06/25/22 [id number]

## 2022-06-26 ENCOUNTER — APPOINTMENT (OUTPATIENT)
Dept: CARDIOLOGY | Facility: CLINIC | Age: 72
DRG: 062 | End: 2022-06-26
Attending: NURSE PRACTITIONER
Payer: COMMERCIAL

## 2022-06-26 ENCOUNTER — APPOINTMENT (OUTPATIENT)
Dept: PHYSICAL THERAPY | Facility: CLINIC | Age: 72
DRG: 062 | End: 2022-06-26
Payer: COMMERCIAL

## 2022-06-26 ENCOUNTER — APPOINTMENT (OUTPATIENT)
Dept: OCCUPATIONAL THERAPY | Facility: CLINIC | Age: 72
DRG: 062 | End: 2022-06-26
Payer: COMMERCIAL

## 2022-06-26 ENCOUNTER — APPOINTMENT (OUTPATIENT)
Dept: MRI IMAGING | Facility: CLINIC | Age: 72
DRG: 062 | End: 2022-06-26
Payer: COMMERCIAL

## 2022-06-26 VITALS
HEART RATE: 48 BPM | WEIGHT: 195.55 LBS | SYSTOLIC BLOOD PRESSURE: 123 MMHG | OXYGEN SATURATION: 96 % | BODY MASS INDEX: 24.31 KG/M2 | HEIGHT: 75 IN | TEMPERATURE: 98.3 F | RESPIRATION RATE: 23 BRPM | DIASTOLIC BLOOD PRESSURE: 74 MMHG

## 2022-06-26 LAB
ABO/RH(D): NORMAL
ANION GAP SERPL CALCULATED.3IONS-SCNC: 10 MMOL/L (ref 7–15)
ANTIBODY SCREEN: NEGATIVE
APTT PPP: 32 SECONDS (ref 22–38)
BUN SERPL-MCNC: 21.4 MG/DL (ref 8–23)
CALCIUM SERPL-MCNC: 8.4 MG/DL (ref 8.8–10.2)
CHLORIDE SERPL-SCNC: 109 MMOL/L (ref 98–107)
CREAT SERPL-MCNC: 0.84 MG/DL (ref 0.67–1.17)
DEPRECATED HCO3 PLAS-SCNC: 19 MMOL/L (ref 22–29)
ERYTHROCYTE [DISTWIDTH] IN BLOOD BY AUTOMATED COUNT: 13.3 % (ref 10–15)
GFR SERPL CREATININE-BSD FRML MDRD: >90 ML/MIN/1.73M2
GLUCOSE SERPL-MCNC: 103 MG/DL (ref 70–99)
HCT VFR BLD AUTO: 41.1 % (ref 40–53)
HGB BLD-MCNC: 13.2 G/DL (ref 13.3–17.7)
INR PPP: 1.04 (ref 0.85–1.15)
LVEF ECHO: NORMAL
MAGNESIUM SERPL-MCNC: 2 MG/DL (ref 1.7–2.3)
MCH RBC QN AUTO: 28.3 PG (ref 26.5–33)
MCHC RBC AUTO-ENTMCNC: 32.1 G/DL (ref 31.5–36.5)
MCV RBC AUTO: 88 FL (ref 78–100)
PHOSPHATE SERPL-MCNC: 2.7 MG/DL (ref 2.5–4.5)
PLATELET # BLD AUTO: 173 10E3/UL (ref 150–450)
POTASSIUM SERPL-SCNC: 3.8 MMOL/L (ref 3.4–5.3)
RADIOLOGIST FLAGS: ABNORMAL
RBC # BLD AUTO: 4.66 10E6/UL (ref 4.4–5.9)
SODIUM SERPL-SCNC: 138 MMOL/L (ref 136–145)
SPECIMEN EXPIRATION DATE: NORMAL
TROPONIN T SERPL HS-MCNC: 11 NG/L
TROPONIN T SERPL HS-MCNC: 12 NG/L
TROPONIN T SERPL HS-MCNC: 13 NG/L
WBC # BLD AUTO: 6 10E3/UL (ref 4–11)

## 2022-06-26 PROCEDURE — 70551 MRI BRAIN STEM W/O DYE: CPT | Mod: 26 | Performed by: RADIOLOGY

## 2022-06-26 PROCEDURE — 93306 TTE W/DOPPLER COMPLETE: CPT | Mod: 26 | Performed by: INTERNAL MEDICINE

## 2022-06-26 PROCEDURE — 86850 RBC ANTIBODY SCREEN: CPT | Performed by: STUDENT IN AN ORGANIZED HEALTH CARE EDUCATION/TRAINING PROGRAM

## 2022-06-26 PROCEDURE — 85730 THROMBOPLASTIN TIME PARTIAL: CPT | Performed by: STUDENT IN AN ORGANIZED HEALTH CARE EDUCATION/TRAINING PROGRAM

## 2022-06-26 PROCEDURE — 84484 ASSAY OF TROPONIN QUANT: CPT | Performed by: NURSE PRACTITIONER

## 2022-06-26 PROCEDURE — 85610 PROTHROMBIN TIME: CPT | Performed by: STUDENT IN AN ORGANIZED HEALTH CARE EDUCATION/TRAINING PROGRAM

## 2022-06-26 PROCEDURE — 36415 COLL VENOUS BLD VENIPUNCTURE: CPT | Performed by: STUDENT IN AN ORGANIZED HEALTH CARE EDUCATION/TRAINING PROGRAM

## 2022-06-26 PROCEDURE — 85027 COMPLETE CBC AUTOMATED: CPT | Performed by: STUDENT IN AN ORGANIZED HEALTH CARE EDUCATION/TRAINING PROGRAM

## 2022-06-26 PROCEDURE — 70551 MRI BRAIN STEM W/O DYE: CPT

## 2022-06-26 PROCEDURE — 255N000002 HC RX 255 OP 636: Performed by: INTERNAL MEDICINE

## 2022-06-26 PROCEDURE — 84100 ASSAY OF PHOSPHORUS: CPT | Performed by: PSYCHIATRY & NEUROLOGY

## 2022-06-26 PROCEDURE — 93246 EXT ECG>7D<15D RECORDING: CPT

## 2022-06-26 PROCEDURE — 250N000013 HC RX MED GY IP 250 OP 250 PS 637: Performed by: NURSE PRACTITIONER

## 2022-06-26 PROCEDURE — 82310 ASSAY OF CALCIUM: CPT | Performed by: STUDENT IN AN ORGANIZED HEALTH CARE EDUCATION/TRAINING PROGRAM

## 2022-06-26 PROCEDURE — 83735 ASSAY OF MAGNESIUM: CPT | Performed by: PSYCHIATRY & NEUROLOGY

## 2022-06-26 PROCEDURE — 250N000013 HC RX MED GY IP 250 OP 250 PS 637: Performed by: STUDENT IN AN ORGANIZED HEALTH CARE EDUCATION/TRAINING PROGRAM

## 2022-06-26 PROCEDURE — 86901 BLOOD TYPING SEROLOGIC RH(D): CPT | Performed by: STUDENT IN AN ORGANIZED HEALTH CARE EDUCATION/TRAINING PROGRAM

## 2022-06-26 PROCEDURE — 93248 EXT ECG>7D<15D REV&INTERPJ: CPT | Performed by: INTERNAL MEDICINE

## 2022-06-26 PROCEDURE — 97161 PT EVAL LOW COMPLEX 20 MIN: CPT | Mod: GP | Performed by: PHYSICAL THERAPIST

## 2022-06-26 PROCEDURE — 97165 OT EVAL LOW COMPLEX 30 MIN: CPT | Mod: GO

## 2022-06-26 PROCEDURE — 99238 HOSP IP/OBS DSCHRG MGMT 30/<: CPT | Performed by: NURSE PRACTITIONER

## 2022-06-26 PROCEDURE — 97535 SELF CARE MNGMENT TRAINING: CPT | Mod: GO

## 2022-06-26 PROCEDURE — 36415 COLL VENOUS BLD VENIPUNCTURE: CPT | Performed by: NURSE PRACTITIONER

## 2022-06-26 RX ORDER — ASPIRIN 325 MG
325 TABLET ORAL DAILY
Qty: 30 TABLET | Refills: 1 | Status: SHIPPED | OUTPATIENT
Start: 2022-06-26

## 2022-06-26 RX ORDER — ACETAMINOPHEN 325 MG/1
650 TABLET ORAL EVERY 4 HOURS PRN
COMMUNITY
Start: 2022-06-26

## 2022-06-26 RX ORDER — ASPIRIN 325 MG
325 TABLET ORAL DAILY
Status: DISCONTINUED | OUTPATIENT
Start: 2022-06-26 | End: 2022-06-26 | Stop reason: HOSPADM

## 2022-06-26 RX ORDER — ATORVASTATIN CALCIUM 40 MG/1
40 TABLET, FILM COATED ORAL EVERY EVENING
Qty: 30 TABLET | Refills: 1 | Status: SHIPPED | OUTPATIENT
Start: 2022-06-26

## 2022-06-26 RX ORDER — CLOPIDOGREL BISULFATE 75 MG/1
75 TABLET ORAL DAILY
Qty: 30 TABLET | Refills: 3 | Status: SHIPPED | OUTPATIENT
Start: 2022-06-26

## 2022-06-26 RX ORDER — CLOPIDOGREL BISULFATE 75 MG/1
75 TABLET ORAL DAILY
Status: DISCONTINUED | OUTPATIENT
Start: 2022-06-26 | End: 2022-06-26 | Stop reason: HOSPADM

## 2022-06-26 RX ADMIN — ASPIRIN 325 MG ORAL TABLET 325 MG: 325 PILL ORAL at 14:56

## 2022-06-26 RX ADMIN — FINASTERIDE 5 MG: 5 TABLET, FILM COATED ORAL at 07:53

## 2022-06-26 RX ADMIN — LATANOPROST 1 DROP: 50 SOLUTION OPHTHALMIC at 07:53

## 2022-06-26 RX ADMIN — TAMSULOSIN HYDROCHLORIDE 0.4 MG: 0.4 CAPSULE ORAL at 07:53

## 2022-06-26 RX ADMIN — CLOPIDOGREL BISULFATE 75 MG: 75 TABLET ORAL at 14:56

## 2022-06-26 RX ADMIN — HUMAN ALBUMIN MICROSPHERES AND PERFLUTREN 5 ML: 10; .22 INJECTION, SOLUTION INTRAVENOUS at 09:10

## 2022-06-26 ASSESSMENT — ACTIVITIES OF DAILY LIVING (ADL)
ADLS_ACUITY_SCORE: 21
PREVIOUS_RESPONSIBILITIES: YARDWORK;DRIVING;WORK
ADLS_ACUITY_SCORE: 21
ADLS_ACUITY_SCORE: 21
DEPENDENT_IADLS:: INDEPENDENT
ADLS_ACUITY_SCORE: 21

## 2022-06-26 ASSESSMENT — VISUAL ACUITY
OU: BASELINE;GLASSES
OU: GLASSES;BASELINE
OU: GLASSES;BASELINE
OU: BASELINE;GLASSES
OU: GLASSES;BASELINE

## 2022-06-26 NOTE — PROGRESS NOTES
Neurocritical Care Progress Note    Reason for critical care admission: post- tNK  Admitting Team: NCC  Date of Service:  06/26/2022  Date of Admission:  6/25/2022  Hospital Day: 2    Assessment/Plan  Trev Bergeron is a 72 year old male with a past medical history of HTN and BPH admitted on 6/25/2022 for left hemiparesis and left sensory loss in the setting of R M2 stenosis vs occlusion, now s/p tNK.    24 hour events: Remains intact. MRI this AM. Potential discharge this afternoon.    Neuro  #R M2 occlusion:  -Neurochecks every 4 hrs  -MRI Brain without IV contrast pending  -TTE pending, EKG SR, troponin x3 WNL  -, HgbA1c 6.3%  -Cardiac monitoring  -Asprin 81 mg PO daily (will order after MRI resulted)  -HOB > 30   -SBP goal < 180 mmHg  -PT/OT/SLP: okay to discharge home without further therapy recommendation      #Analgesics & sedation  -Tylenol prn     CV  #Hypertension  #Bradycardia, sinus, asymptomatic   -Cardiac monitoring  -SBP goal < 180 mmHg  -PRN labetalol and hydralazine     Resp  Oxygen/vent: none on room air  -Continuous pulse ox  -Maintain O2 saturations greater than 92%     GI  Diet: Regular diet   Last BM: PTA  GI prophylaxis: not indicated  -Bowel regimen: scheduled senna-docusate and Miralax     Renal/  -Daily BMP  -IV fluids: none  -Electrolyte replacement protocol     Endo  #SANDI  -Hgb A1c 6.3%  -Monitor glucose levels     Heme  -Daily CBC  -Hgb goal >7, plt goal >50k  -Transfuse to meet Hgb and plt goals as needed      ID  -Afebrile  -Daily CBC  -Follow temperature curve     ICU Check List  Lines/tubes/drains:  FEN: regular diet  PPX: DVT - SCDs, no chemoppx due to tNK; GI - not indicated.  Code: Full Code  Dispo: ICU - NCC      TIME SPENT ON THIS ENCOUNTER   I spent 25 minutes of time on the unit/floor managing the care of Trev Bergeron. Greater than 50% of my time was spent at the bedside counseling the patient and/or coordinating care regarding services listed in this note.    The  "patient was seen and discussed with the NCC attending, Dr. Jo, and she is in agreement with the assessment and plan.    Rocío Deleon, EVE, CNP  Neurocritical Care *[id number]    24 Hour Vital Signs Summary:  Temp: 98  F (36.7  C) Temp  Min: 97.1  F (36.2  C)  Max: 98  F (36.7  C)  Resp: 21 Resp  Min: 9  Max: 25  SpO2: 97 % SpO2  Min: 94 %  Max: 100 %  Pulse: (!) 45 Pulse  Min: 38  Max: 64    No data recorded  BP: 125/78 Systolic (24hrs), Av , Min:107 , Max:175   Diastolic (24hrs), Av, Min:52, Max:97    Respiratory monitoring:   Resp: 21      I/O last 3 completed shifts:  In: 450 [P.O.:450]  Out: 1275 [Urine:1275]    Current Medications:    atorvastatin  40 mg Oral QPM     finasteride  5 mg Oral Daily     latanoprost  1 drop Both Eyes Daily     sodium chloride (PF)  3 mL Intracatheter Q8H     tamsulosin  0.4 mg Oral BID       PRN Medications:  acetaminophen, labetalol **OR** hydrALAZINE, lidocaine 4%, lidocaine (buffered or not buffered), - MEDICATION INSTRUCTIONS -, sodium chloride (PF)    Infusions:    - MEDICATION INSTRUCTIONS -         Allergies   Allergen Reactions     Penicillins        Physical Examination:  Vitals: /78   Pulse (!) 45   Temp 98  F (36.7  C) (Oral)   Resp 21   Ht 1.905 m (6' 3\")   Wt 88.7 kg (195 lb 8.8 oz)   SpO2 97%   BMI 24.44 kg/m    General: Adult male patient, sitting in chair, NAD   HEENT: Normocephalic, atraumatic, no icterus, oral cavity/oropharynx pink and moist.  Cardiac: He appears adequately perfused.   Chest: He is not hypoxic, no respiratory distress.   Abdomen: Non-distended.   Extremities: Appears adequately perfused.   Skin: No obvious rashes or lesions on exposed skin.   Psych: Calm and cooperative.  Neuro:  Mental status: Awake, alert, attentive, oriented to self, time, place, and circumstance. Language is fluent and coherent with intact comprehension of complex commands, naming and repetition.  Cranial nerves: Chronic left facial droop. CN grossly " intact.   Motor: Normal bulk and tone. No abnormal movements. 5/5 strength in 4/4 extremities.   Sensory: Deferred.   Coordination: Smooth movement, no obvious acute issues.   Gait: Stable heel-toe walking observed.     Labs:  Recent Labs   Lab 06/26/22  0351 06/25/22  0806 06/25/22  0800     --  142   POTASSIUM 3.8  --  4.1   CHLORIDE 109*  --  108*   CO2 19*  --  23   BUN 21.4  --  25.5*   CR 0.84 1.0 0.99   BUTCH 8.4*  --  9.5       Recent Labs   Lab 06/26/22  0351 06/25/22  0800   WBC 6.0 4.4   HGB 13.2* 14.8    193

## 2022-06-26 NOTE — PROGRESS NOTES
06/26/22 1000   Quick Adds   Type of Visit Initial PT Evaluation   Living Environment   People in Home spouse   Current Living Arrangements house   Home Accessibility stairs to enter home;stairs within home   Number of Stairs, Main Entrance 2   Stair Railings, Main Entrance railing on right side (ascending)   Number of Stairs, Within Home, Primary greater than 10 stairs   Stair Railings, Within Home, Primary railing on right side (ascending)   Transportation Anticipated car, drives self;family or friend will provide   Living Environment Comments spouse can assist as needed   Self-Care   Usual Activity Tolerance good   Current Activity Tolerance good   Regular Exercise Yes   Activity/Exercise Type walking   Exercise Amount/Frequency daily;45 mins   Equipment Currently Used at Home none   Fall history within last six months no   Activity/Exercise/Self-Care Comment IND with all functional mobility and ADLs   General Information   Onset of Illness/Injury or Date of Surgery 06/25/22   Referring Physician Dane Alexandre MD   Patient/Family Therapy Goals Statement (PT) return home   Pertinent History of Current Problem (include personal factors and/or comorbidities that impact the POC) Trev Bergeron is a 72 year old male with a past medical history of HTN and BPH admitted on 6/25/2022 for left hemiparesis and left sensory loss in the setting of R M2 stenosis vs occlusion, now s/p tNK.   Cognition   Affect/Mental Status (Cognition) WFL   Orientation Status (Cognition) oriented x 4   Follows Commands (Cognition) WNL   Posture    Posture Forward head position;Protracted shoulders   Range of Motion (ROM)   ROM Comment B LE grossly WFL   Strength (Manual Muscle Testing)   Strength Comments B LE grossly 5/5   Transfers   Comment, (Transfers) sit > stand IND   Gait/Stairs (Locomotion)   Comment, (Gait/Stairs) ambulated > 200' IND. stairs x 12, R rail mod IND   Balance   Balance Comments romberg eyes open/closed, 180 degree  turns and gait with head movement and no LOB   Sensory Examination   Sensory Perception Comments light touch diminished B feet (baseline), proprioception intact B great toe   Coordination   Coordination Comments heel > shin intact B   Muscle Tone   Muscle Tone no deficits were identified   Clinical Impression   Criteria for Skilled Therapeutic Intervention Evaluation only   PT Diagnosis (PT) no residual deficits in functional mobility s/p stroke   Clinical Presentation (PT Evaluation Complexity) Stable/Uncomplicated   Clinical Presentation Rationale clinical judgement   Clinical Decision Making (Complexity) low complexity   Risk & Benefits of therapy have been explained evaluation/treatment results reviewed;care plan/treatment goals reviewed   PT Discharge Planning   PT Discharge Recommendation (DC Rec) home   PT Rationale for DC Rec patient demonstrated IND with functional mobility for household distances, gait/balance appears to be at baseline   PT Brief overview of current status ambulated IND, performed stairs with rail mod IND.   Plan of Care Review   Plan of Care Reviewed With patient;spouse   Total Evaluation Time   Total Evaluation Time (Minutes) 11

## 2022-06-26 NOTE — PROGRESS NOTES
06/26/22 0950   Quick Adds   Type of Visit Initial Occupational Therapy Evaluation       Present no   Living Environment   People in Home spouse   Current Living Arrangements house   Home Accessibility stairs to enter home;stairs within home   Number of Stairs, Main Entrance 2   Number of Stairs, Within Home, Primary other (see comments)  (flight to lower level)   Transportation Anticipated car, drives self;family or friend will provide   Living Environment Comments Pt lives in house with spouse, ~2 NICO, flight of stairs to basement, tub/shower unit.   Self-Care   Usual Activity Tolerance good   Current Activity Tolerance good   Equipment Currently Used at Home none   Fall history within last six months no   Activity/Exercise/Self-Care Comment Pt IND with ADL tasks at baseline.   Instrumental Activities of Daily Living (IADL)   Previous Responsibilities yardwork;driving;work   IADL Comments Pt reports IADL IND at baseline, drives self, works in accounting ~2x/week.   General Information   Onset of Illness/Injury or Date of Surgery 06/25/22   Referring Physician Dane Alexandre MD   Patient/Family Therapy Goal Statement (OT) Did not state   Additional Occupational Profile Info/Pertinent History of Current Problem Per chart, Trev Bergeron is a 72 year old male with a past medical history of HTN and BPH admitted on 6/25/2022 for left hemiparesis and left sensory loss in the setting of R M2 stenosis vs occlusion, now s/p tNK.   Existing Precautions/Restrictions no known precautions/restrictions   Limitations/Impairments safety/cognitive   Left Upper Extremity (Weight-bearing Status) full weight-bearing (FWB)   Right Upper Extremity (Weight-bearing Status) full weight-bearing (FWB)   Left Lower Extremity (Weight-bearing Status) full weight-bearing (FWB)   Right Lower Extremity (Weight-bearing Status) full weight-bearing (FWB)   General Observations and Info Activity: Advance activity as  tolerated   Cognitive Status Examination   Orientation Status orientation to person, place and time   Cognitive Status Comments Cognition appears intact   Visual Perception   Visual Impairment/Limitations WFL;corrective lenses full-time   Impact of Vision Impairment on Function (Vision) Denies acute vision changes   Sensory   Sensory Quick Adds No deficits were identified   Sensory Comments Denies B UE N/T   Pain Assessment   Patient Currently in Pain No   Integumentary/Edema   Integumentary/Edema no deficits were identifed   Posture   Posture not impaired   Posture Comments in static standing   Range of Motion Comprehensive   General Range of Motion bilateral upper extremity ROM WFL   Comment, General Range of Motion B UE ROM WFL during informal screen   Strength Comprehensive (MMT)   General Manual Muscle Testing (MMT) Assessment no strength deficits identified   Comment, General Manual Muscle Testing (MMT) Assessment B UE strength MMT 5/5   Muscle Tone Assessment   Muscle Tone Quick Adds No deficits were identified   Coordination   Upper Extremity Coordination No deficits were identified   Coordination Comments B UE GMC and FMC WFL; Per pt report during L UE functional reaching, slight decreased speed of movement   Bed Mobility   Bed Mobility supine-sit   Comment (Bed Mobility) per clinical judgement, IND with bed mobility   Transfers   Transfers sit-stand transfer;toilet transfer   Sit-Stand Transfer   Sit-Stand [place] (Transfers) independent   Sit/Stand Transfer Comments IND STS from bedside chair   Toilet Transfer   Type (Toilet Transfer) sit-stand   Toilet Transfer Comments Per clinical judgement, IND with STS from toilet   Balance   Balance Assessment standing balance: dynamic   Balance Comments SBA + unilateral UE functional reaching in various planes in static standing, no LOB noted   Activities of Daily Living   BADL Assessment/Intervention grooming;upper body dressing;lower body dressing;toileting  "  Upper Body Dressing Assessment/Training   Comment, (Upper Body Dressing) Per clinical judgement, IND with UB dressing   [place] Level (Upper Body Dressing) independent   Lower Body Dressing Assessment/Training   Position (Lower Body Dressing) unsupported sitting   Comment, (Lower Body Dressing) per clinical judgement, IND with LB dressing   [place] Level (Lower Body Dressing) independent   Grooming Assessment/Training   Position (Grooming) unsupported standing   [place] Level (Grooming) independent   Comment, (Grooming) IND standing sink side tasks   Toileting   Comment, (Toileting) Per clinical judgement, IND with toileting tasks   [place] Level (Toileting) independent   Clinical Impression   Criteria for Skilled Therapeutic Interventions Met (OT) Evaluation only;Other (see comments)  (Eval and Treat 1x only)   OT Diagnosis Assessing ADL function, cognition, vision, mobility 2/2 \"R M2 stenosis vs occlusion, now s/p tNK.\"   OT Problem List-Impairments impacting ADL problems related to;other (see comments);motor control  (L UE)   Assessment of Occupational Performance 1-3 Performance Deficits   Identified Performance Deficits L UE GMC/FMC during ADL/IADL tasks   Planned Therapy Interventions (OT) neuromuscular re-education;progressive activity/exercise   Clinical Decision Making Complexity (OT) low complexity   Anticipated Equipment Needs Upon Discharge (OT) other (see comments)  (none)   Risk & Benefits of therapy have been explained evaluation/treatment results reviewed;care plan/treatment goals reviewed;participants included;patient;spouse/significant other   Clinical Impression Comments Pt presenting near baseline function for ADL tasks and functional mobility; Evaluation and treatment 1x only this date, no further acute OT needs identified.   OT Discharge Planning   OT Discharge Recommendation (DC Rec) home   OT Rationale for DC Rec Pt presenting near baseline function for ADL tasks, " functional mobility, vision, and cognition; Anticipate when medically ready, pt will be able to discharge home.   OT Brief overview of current status IND in room   Total Evaluation Time (Minutes)   Total Evaluation Time (Minutes) 5   OT Goals   Therapy Frequency (OT) One time eval and treatment   OT Predicted Duration/Target Date for Goal Attainment 06/26/22   OT Goals Hygiene/Grooming;Toilet Transfer/Toileting;Cognition   OT: Hygiene/Grooming independent;Goal Met   OT: Toilet Transfer/Toileting Independent;Goal Met   OT: Cognitive Goal Met

## 2022-06-26 NOTE — PHARMACY-CONSULT NOTE
Pharmacy Consult to evaluate for medication related stroke core measures    Trev Bergeron, 72 year old male admitted for ischemic stroke on 6/25/2022.    Thrombolytic was given on 6/25/22 at 0830.    VTE Prophylaxis SCDs /PCDs placed on 6/25/22, as appropriate prior to end of hospital day 2.    Antithrombotic: Aspirin started on 6/26/22, as appropriate by end of hospital day 2. Continue antithrombotic therapy on discharge to meet quality measures, unless contraindicated.    Anticoagulation if history of A-fib/flutter: Patient does not have history of A-fib/flutter - anticoagulation not required for medication related stroke core measures.     LDL Cholesterol Calculated   Date Value Ref Range Status   06/25/2022 136 (H) <=100 mg/dL Final       Patient currently receiving Lipitor (atorvastatin) continue statin on discharge to meet quality measures, unless contraindicated.    Recommendations: None at this time    Christofer Rausch [facility] 6/26/2022 7:14 AM

## 2022-06-27 ENCOUNTER — PATIENT OUTREACH (OUTPATIENT)
Dept: CARE COORDINATION | Facility: CLINIC | Age: 72
End: 2022-06-27

## 2022-06-27 DIAGNOSIS — Z71.89 OTHER SPECIFIED COUNSELING: ICD-10-CM

## 2022-06-27 NOTE — PROGRESS NOTES
Clinic Care Coordination Contact  [facility]/Voicemail     Clinical Data: Care Coordinator Outreach - TCM      Outreach attempted x 1. No answer with call attempt.      Plan:  Care Coordinator will try to reach patient again in 1 business day.       Inés Rose RN  [facility] Care Resource [facility]

## 2022-06-28 ENCOUNTER — HOSPITAL ENCOUNTER (OUTPATIENT)
Dept: EDUCATION SERVICES | Facility: CLINIC | Age: 72
Discharge: HOME | End: 2022-06-28
Payer: COMMERCIAL

## 2022-06-28 NOTE — CONSULTS
06/28/22 1749 Klisch, Christine M, RN     Stroke Education Note     The following information has been reviewed with the patient and family:     1. Warning signs of stroke     2. Calling 911 if having warning signs of stroke     3. All modifiable risk factors: hypertension, CAD, atrial fib, diabetes, hypercholesterolemia, smoking, substance abuse, diet, physical inactivity, obesity, sleep apnea.     4. Patient's risk factors for stroke which include: HLD     5. Follow-up plan for after discharge     6. Discharge medications which include: aspirin,plavix,lipitor     In addition, the above information was given to the patient and family in writing as a part of the [facility] Stroke Class Handout.     Learner's response to risk factors / lifestyle modification education: Taking steps      Christine M Klisch, RN

## 2022-06-28 NOTE — PROGRESS NOTES
Clinic Care Coordination Contact  [facility]/Voicemail    Clinical Data: Care Coordinator Outreach  Outreach attempted x 2. Left message on patient's voicemail with call back information and requested return call.    Plan:Care Coordinator will do no further outreaches at this time.    GASTON Gonzalez  [phone]  [facility] Resource [facility]

## 2022-06-29 DIAGNOSIS — Z86.73 HISTORY OF CVA (CEREBROVASCULAR ACCIDENT): Primary | ICD-10-CM

## 2022-07-22 ENCOUNTER — ALLIED HEALTH/NURSE VISIT (OUTPATIENT)
Dept: CARDIOLOGY | Facility: CLINIC | Age: 72
End: 2022-07-22
Payer: COMMERCIAL

## 2022-07-22 DIAGNOSIS — Z86.73 HISTORY OF CVA (CEREBROVASCULAR ACCIDENT): ICD-10-CM

## 2022-08-16 ENCOUNTER — TELEPHONE (OUTPATIENT)
Dept: NEUROLOGY | Facility: CLINIC | Age: 72
End: 2022-08-16

## 2022-08-16 NOTE — TELEPHONE ENCOUNTER
Placed call to patient to inform him of Zio patch results - no a-fib, but had run of SVT.  Informed him that recommendation is that he see PCP or Cardiology in regard to it.    Trev voiced understanding - no further questions.    Galileo Laguerre RN

## 2022-09-12 ENCOUNTER — PATIENT OUTREACH (OUTPATIENT)
Dept: NEUROLOGY | Facility: CLINIC | Age: 72
End: 2022-09-12

## 2023-07-07 NOTE — DISCHARGE SUMMARY
[facility]    Neurology Stroke Discharge Summary    Date of Admission: 6/25/2022  Date of Discharge: 06/26/2022    Disposition: Discharged to home  Primary Care Physician: No primary care provider on file.      Admission Diagnosis:   Acute stroke post- tNK    Discharge Diagnosis:   Acute ischemic stroke of right posterior MCA territory due to undetermined etiology likely intra-cranial atherosclerosis versus cardioembolism versus thromboembolic event from right carotid stenosis     Problem Leading to Hospitalization (from [facility]):   Mr. Bergeron presented to [facility] ER via EMS on 6/25/2022 for evaluation and management of left sided weakness, left sided numbness, and left facial droop with onset approximately 7 am on 6/25/2022. Code Stroke was called upon arrival to ED with initial NIHSS 4 (sensory deficits, left facial droop, and left homonymous hemianopsia). 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 He was given tNK at 8: 30 AM on 6/25/2022 with improvement in NIHSS to 0-1 with score for subtle left hand weakness and subtle left facial symmetry which appears to be chronic.     Please see H&P dated 6/25/2022 for further details about presentation.    Brief Hospital Course:   Patient presented with  left sided weakness, left sided numbness, and left facial droop.    Found to have right M2 occlusion versus high-grade stenosis.      IV thrombolysis was administered at 8: 30 AM on 6/25/2022.      Work-up as stated below under Pertinent Investigations.    Etiology is thought to be intra-cranial atherosclerosis versus cardio-embolism verus thromboembolus from right carotid.      Rehab evaluation: OT, PT and SLP.     Smoking Cessation: patient is not a smoker    BP Long-term Goal: 140/90 or less    Antithrombotic/Anticoagulant Agent: aspirin 325 mg and clopidogrel (Plavix) 75 mg    Statins: Prior to  admission statin agent changed to atorvastatin        Hgb A1C Goal: < 7.0    Complications: None.     Other problems addressed during this hospitalization:  None.     PERTINENT INVESTIGATIONS    Labs  Lipid Panel: Recent Labs   Lab Test 06/25/22  0800   CHOL 206*   HDL 43   *   TRIG 136     A1C:   Lab Results   Component Value Date    A1C 6.3 06/25/2022     INR:   Recent Labs   Lab 06/26/22  0351 06/25/22  0800   INR 1.04 0.94      Coag Panel / Hypercoag Workup: Not indicated  Pending test results: Zio Patch     Echocardiogram, 6/26/2022   Interpretation Summary  Global and regional left ventricular function is normal with an EF of 60-65%.  Global right ventricular function is normal.  No significant valvular abnormalities present.  Previous study not available for comparison.    Imaging:  CTA head/neck, 6/25/2022  1. Focal occlusion versus high-grade narrowing of an M2 branch of the  right MCA.  2. 50% stenosis of the proximal right ICA.  3. 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    CT head without, 6/25/2022  1. No acute intracranial pathology.   2. Left maxillary molar periapical abscesses without involvement of  the adjacent soft tissues.    MRI brain, 6/26/2022  Late hyperacute to acute infarcts in the posterior right MCA territory.     Endovascular procedure: None     Cardiac Monitoring: Patient had > 24 hrs of cardiac monitor while in hospital.    Findings: No atrial fibrillation was found.    Sleep Apnea Screen:   Questions/Answers      1. Prior to your stroke, have you been told that you snore? Yes.    2. Prior to your stroke, have you been told that you struggle to breath while you are sleeping? No.    3. Prior to your stroke, do you feel tired and sleepy even after getting a normal night of sleep? No.    Sleep Apnea Screen Findings: Patient has 0-1 symptoms of sleep apnea.  Further sleep study is not recommended at this  time.    Education discussed with: patient and spouse on blood pressure management, cholesterol management, medical management, driving recommendation (no driving until OP follow-up with OT for driving evaluation), follow-up recommendations/plan, 911 call if warning signs of stroke and results (CT, MRI, echocardiogram).    During daily rounds, the plan of care was discussed and developed with patient and spouse.  Plan of care includes: dual anti-platelet, OP follow-up with stroke, OP driving evaluation by OT, continue carotid surveillance.    PHYSICAL EXAMINATION  Vital Signs:  B/P: 123/74, T: 98.3, P: 48, R: 23    General:  patient lying in bed without any acute distress     HEENT:  normocephalic/atraumatic  Cardio:  RRR, bradycardic  Pulmonary:  no respiratory distress  Abdomen:  non-distended  Extremities:  no edema  Skin:  intact, warm/dry     Neurologic  Mental Status:  fully alert, attentive and oriented, follows commands, speech clear and fluent  Cranial Nerves:  visual fields intact, PERRL, facial sensation intact and symmetric, facial movements symmetric, hearing not formally tested but intact to conversation, no dysarthria, tongue protrusion midline  Motor:  no abnormal movements, normal tone throughout, normal muscle bulk, no pronator drift, strength 5/5 throughout upper and lower extremities  Reflexes:  toes downgoing  Sensory:  Intact to light touch  Coordination:  FNF and HS intact without dysmetria  Station/Gait:  normal width, stride length, turn, and arm swing     National Institutes of Health Stroke Scale (on day of discharge)  NIHSS Total Score: 1 chronic left facial droop/ asymmetry     Modified Jabier Score (Discharge)  0-No deficits    Medications    Current Discharge Medication List      START taking these medications    Details   acetaminophen (TYLENOL) 325 MG tablet Take 2 tablets (650 mg) by mouth every 4 hours as needed for mild pain or fever    Associated Diagnoses: Acute CVA  (cerebrovascular accident) (H)      aspirin (ASA) 325 MG tablet Take 1 tablet (325 mg) by mouth daily  Qty: 30 tablet, Refills: 1    Associated Diagnoses: Acute CVA (cerebrovascular accident) (H)      atorvastatin (LIPITOR) 40 MG tablet Take 1 tablet (40 mg) by mouth every evening  Qty: 30 tablet, Refills: 1    Associated Diagnoses: Acute CVA (cerebrovascular accident) (H)      clopidogrel (PLAVIX) 75 MG tablet Take 1 tablet (75 mg) by mouth daily  Qty: 30 tablet, Refills: 3    Associated Diagnoses: Acute CVA (cerebrovascular accident) (H)         CONTINUE these medications which have NOT CHANGED    Details   finasteride (PROSCAR) 5 MG tablet Take 5 mg by mouth daily      latanoprost (XALATAN) 0.005 % ophthalmic solution Place 1 drop into both eyes daily      tamsulosin (FLOMAX) 0.4 MG capsule Take 0.4 mg by mouth 2 times daily         STOP taking these medications       simvastatin (ZOCOR) 20 MG tablet Comments:   Reason for Stopping:               Additional recommendations and follow up:       Adult Neurology  Referral      Occupational Therapy Referral      Reason for your hospital stay    You were in the hospital for evaluation and management of acute stroke.     Activity    Your activity upon discharge: activity as tolerated. It is highly recommended that you do not drive until cleared to drive by outpatient OT evaluation.     Follow Up and recommended labs and tests    Follow up with primary care provider, No primary care provider on file., within 7 days to evaluate medication change and for hospital follow- up.  No follow up labs or test are needed.    - simvastatin discontinued, atorvastatin initiated due to elevated LDL  - monitor hemoglobin A1c  - monitor Zio Patch (heart monitor result)  - you will use 2 Zio Patches, place one for 14 days then mail back, then place the second for 14 days then mail back    Follow-up with Stroke Neurology at [place] for ongoing stroke care.     Follow Up and  recommended labs and tests    Discuss right carotid artery 50% stenosis surveillance with PCP.     Follow Up and recommended labs and tests    You will take Asprin and Plavix every day for 3 months. After 3 months discontinue Plavix and continue taking Aspirin daily.     Adult Leadless EKG Monitor 8 to 14 Days    Results need to be sent to PCP.     Diet    Follow this diet upon discharge: Orders Placed This Encounter      Regular Diet Adult Thin Liquids (level 0)       Patient was seen and discussed with the Attending, Dr. Jo.    EVE Knight, CNP  Neurocritical Care *[phone]  Pager: [phone]     Birth Control Pills Counseling: Birth Control Pill Counseling: I discussed with the patient the potential side effects of OCPs including but not limited to increased risk of stroke, heart attack, thrombophlebitis, deep venous thrombosis, hepatic adenomas, breast changes, GI upset, headaches, and depression.  The patient verbalized understanding of the proper use and possible adverse effects of OCPs. All of the patient's questions and concerns were addressed.